# Patient Record
Sex: FEMALE | URBAN - METROPOLITAN AREA
[De-identification: names, ages, dates, MRNs, and addresses within clinical notes are randomized per-mention and may not be internally consistent; named-entity substitution may affect disease eponyms.]

---

## 2024-09-25 ENCOUNTER — OFFICE VISIT (OUTPATIENT)
Dept: URBAN - METROPOLITAN AREA CLINIC 115 | Facility: CLINIC | Age: 74
End: 2024-09-25
Payer: MEDICARE

## 2024-09-25 ENCOUNTER — LAB OUTSIDE AN ENCOUNTER (OUTPATIENT)
Dept: URBAN - METROPOLITAN AREA CLINIC 115 | Facility: CLINIC | Age: 74
End: 2024-09-25

## 2024-09-25 ENCOUNTER — DASHBOARD ENCOUNTERS (OUTPATIENT)
Age: 74
End: 2024-09-25

## 2024-09-25 VITALS
BODY MASS INDEX: 23.64 KG/M2 | TEMPERATURE: 96.7 F | HEART RATE: 60 BPM | SYSTOLIC BLOOD PRESSURE: 144 MMHG | HEIGHT: 63 IN | WEIGHT: 133.4 LBS | DIASTOLIC BLOOD PRESSURE: 76 MMHG

## 2024-09-25 DIAGNOSIS — R10.13 EPIGASTRIC PAIN: ICD-10-CM

## 2024-09-25 DIAGNOSIS — Z79.01 ANTICOAGULATED ON COUMADIN: ICD-10-CM

## 2024-09-25 DIAGNOSIS — M81.0 OSTEOPOROSIS WITHOUT CURRENT PATHOLOGICAL FRACTURE, UNSPECIFIED OSTEOPOROSIS TYPE: ICD-10-CM

## 2024-09-25 DIAGNOSIS — K59.09 OTHER CONSTIPATION: ICD-10-CM

## 2024-09-25 DIAGNOSIS — D64.9 CHRONIC ANEMIA: ICD-10-CM

## 2024-09-25 DIAGNOSIS — R63.0 DECREASED APPETITE: ICD-10-CM

## 2024-09-25 DIAGNOSIS — R12 HEARTBURN: ICD-10-CM

## 2024-09-25 PROBLEM — 64379006: Status: ACTIVE | Noted: 2024-09-25

## 2024-09-25 PROBLEM — 10191004: Status: ACTIVE | Noted: 2024-09-25

## 2024-09-25 PROBLEM — 64859006: Status: ACTIVE | Noted: 2024-09-25

## 2024-09-25 PROCEDURE — 99244 OFF/OP CNSLTJ NEW/EST MOD 40: CPT

## 2024-09-25 RX ORDER — WARFARIN 2.5 MG/1
TABLET ORAL
Qty: 180 TABLET | Status: ACTIVE | COMMUNITY

## 2024-09-25 RX ORDER — LOSARTAN POTASSIUM AND HYDROCHLOROTHIAZIDE 100; 12.5 MG/1; MG/1
TAKE ONE TABLET BY MOUTH ONE TIME DAILY TABLET, FILM COATED ORAL
Qty: 30 UNSPECIFIED | Refills: 4 | Status: ACTIVE | COMMUNITY

## 2024-09-25 RX ORDER — OMEPRAZOLE 40 MG/1
1 CAPSULE 30 MINUTES BEFORE MORNING MEAL CAPSULE, DELAYED RELEASE ORAL ONCE A DAY
OUTPATIENT

## 2024-09-25 RX ORDER — OMEPRAZOLE 40 MG/1
1 CAPSULE 30 MINUTES BEFORE MORNING MEAL CAPSULE, DELAYED RELEASE ORAL ONCE A DAY
Status: ACTIVE | COMMUNITY

## 2024-09-25 RX ORDER — NIFEDIPINE 90 MG/1
TAKE ONE TABLET BY MOUTH ONE TIME DAILY TABLET, EXTENDED RELEASE ORAL
Qty: 90 UNSPECIFIED | Refills: 1 | Status: ACTIVE | COMMUNITY

## 2024-09-25 RX ORDER — LATANOPROST 50 UG/ML
INSTILL ONE DROP IN EACH EYE EVERY EVENING SOLUTION OPHTHALMIC
Qty: 2.5 UNSPECIFIED | Refills: 5 | Status: ACTIVE | COMMUNITY

## 2024-09-25 RX ORDER — METHOCARBAMOL 750 MG/1
TAKE ONE TABLET BY MOUTH AT BEDTIME AS NEEDED FOR PAIN TABLET ORAL
Qty: 30 UNSPECIFIED | Refills: 4 | Status: ACTIVE | COMMUNITY

## 2024-09-25 RX ORDER — SOTALOL HYDROCHLORIDE 80 MG/1
1 TABLET TABLET ORAL
Status: ACTIVE | COMMUNITY

## 2024-09-25 RX ORDER — ATORVASTATIN CALCIUM 10 MG/1
TABLET, FILM COATED ORAL
Qty: 90 TABLET | Status: ACTIVE | COMMUNITY

## 2024-09-25 RX ORDER — LEVOTHYROXINE SODIUM 50 UG/1
TABLET ORAL
Qty: 90 TABLET | Status: ACTIVE | COMMUNITY

## 2024-09-25 NOTE — HPI-TODAY'S VISIT:
75 y/o F with PMH of hypothyroidism, mitral valve replacement and paroxysmal afib on coumadin, HTN, HLD, CHF, arthritis, osteoporosis on vit D/Ca presents on referral from Dr. Jomar Dawkins with c/o RUQ/epigastric pain and reflux intermittently for over 1 yr. A copy of this note will be sent to referring provider.  States her sx got more frequent in the last 2 mos. Before it would occur for 1 week and then would be fine for 3 weeks; but since August, she would have sx daily. Reports bloating, nausea, esophageal pain up to the neck, feeling like stomach wasn't emptying, decreased appetite, gas pain, loose stools. BMs are usually qd-qod, incomplete evacuation. Has tried omeprazole 20mg, 40mg qd and with tums/zantac. Does note that with omeprazole 40mg qd her sx have gotten better. RUQ US on 8/29/24 was normal. 6/2024 labs showed Hgb 9.9, MCV 96.4, Na 130, high vit B12, TSH 2.51, normal iron panel. Denies vomiting, blood in stool, dysphagia, odynophagia, unintentional weight loss, early satiety. Denies NSAID use. Occ EtOH. Denies tobacco/marijuana use.  Had CT colonography in 2018, normal per pt.

## 2024-09-26 LAB
HEMATOCRIT: 29.7
HEMOGLOBIN: 9.8
MCH: 32.1
MCHC: 33
MCV: 97.4
MPV: 10
PLATELET COUNT: 284
RDW: 12.9
RED BLOOD CELL COUNT: 3.05
WHITE BLOOD CELL COUNT: 7.2

## 2024-11-20 ENCOUNTER — TELEPHONE ENCOUNTER (OUTPATIENT)
Dept: URBAN - METROPOLITAN AREA CLINIC 85 | Facility: CLINIC | Age: 74
End: 2024-11-20

## 2024-11-27 ENCOUNTER — OFFICE VISIT (OUTPATIENT)
Dept: URBAN - METROPOLITAN AREA CLINIC 115 | Facility: CLINIC | Age: 74
End: 2024-11-27

## 2024-12-02 ENCOUNTER — CLAIMS CREATED FROM THE CLAIM WINDOW (OUTPATIENT)
Dept: URBAN - METROPOLITAN AREA SURGERY CENTER 13 | Facility: SURGERY CENTER | Age: 74
End: 2024-12-02
Payer: MEDICARE

## 2024-12-02 ENCOUNTER — CLAIMS CREATED FROM THE CLAIM WINDOW (OUTPATIENT)
Dept: URBAN - METROPOLITAN AREA CLINIC 4 | Facility: CLINIC | Age: 74
End: 2024-12-02
Payer: MEDICARE

## 2024-12-02 DIAGNOSIS — K21.9 GASTRO-ESOPHAGEAL REFLUX DISEASE WITHOUT ESOPHAGITIS: ICD-10-CM

## 2024-12-02 DIAGNOSIS — K31.89 OTHER DISEASES OF STOMACH AND DUODENUM: ICD-10-CM

## 2024-12-02 DIAGNOSIS — R10.13 EPIGASTRIC ABDOMINAL PAIN: ICD-10-CM

## 2024-12-02 DIAGNOSIS — K21.00 GERD WITH ESOPHAGITIS WITHOUT BLEEDING: ICD-10-CM

## 2024-12-02 DIAGNOSIS — K29.70 GASTRITIS, UNSPECIFIED, WITHOUT BLEEDING: ICD-10-CM

## 2024-12-02 DIAGNOSIS — K20.80 OTHER ESOPHAGITIS WITHOUT BLEEDING: ICD-10-CM

## 2024-12-02 DIAGNOSIS — K20.80 ESOPHAGITIS, LOS ANGELES GRADE B: ICD-10-CM

## 2024-12-02 PROCEDURE — 88305 TISSUE EXAM BY PATHOLOGIST: CPT | Performed by: STUDENT IN AN ORGANIZED HEALTH CARE EDUCATION/TRAINING PROGRAM

## 2024-12-02 PROCEDURE — 00731 ANES UPR GI NDSC PX NOS: CPT | Performed by: CASE MANAGER/CARE COORDINATOR

## 2024-12-02 PROCEDURE — 88342 IMHCHEM/IMCYTCHM 1ST ANTB: CPT | Performed by: STUDENT IN AN ORGANIZED HEALTH CARE EDUCATION/TRAINING PROGRAM

## 2024-12-02 PROCEDURE — 00731 ANES UPR GI NDSC PX NOS: CPT | Performed by: NURSE ANESTHETIST, CERTIFIED REGISTERED

## 2024-12-02 PROCEDURE — 43239 EGD BIOPSY SINGLE/MULTIPLE: CPT | Performed by: CLINIC/CENTER

## 2024-12-02 PROCEDURE — 43239 EGD BIOPSY SINGLE/MULTIPLE: CPT | Performed by: INTERNAL MEDICINE

## 2024-12-10 ENCOUNTER — OFFICE VISIT (OUTPATIENT)
Dept: URBAN - METROPOLITAN AREA CLINIC 115 | Facility: CLINIC | Age: 74
End: 2024-12-10
Payer: MEDICARE

## 2024-12-10 VITALS
WEIGHT: 137.2 LBS | DIASTOLIC BLOOD PRESSURE: 71 MMHG | BODY MASS INDEX: 24.31 KG/M2 | HEIGHT: 63 IN | SYSTOLIC BLOOD PRESSURE: 123 MMHG | HEART RATE: 65 BPM | TEMPERATURE: 98.6 F

## 2024-12-10 DIAGNOSIS — K21.00 GASTROESOPHAGEAL REFLUX DISEASE WITH ESOPHAGITIS WITHOUT HEMORRHAGE: ICD-10-CM

## 2024-12-10 DIAGNOSIS — K59.09 CHRONIC CONSTIPATION: ICD-10-CM

## 2024-12-10 PROBLEM — 266433003: Status: ACTIVE | Noted: 2024-12-10

## 2024-12-10 PROCEDURE — 99213 OFFICE O/P EST LOW 20 MIN: CPT

## 2024-12-10 RX ORDER — SOTALOL HYDROCHLORIDE 80 MG/1
1 TABLET TABLET ORAL
Status: ACTIVE | COMMUNITY

## 2024-12-10 RX ORDER — OMEPRAZOLE 40 MG/1
1 CAPSULE 30 MINUTES BEFORE MORNING MEAL CAPSULE, DELAYED RELEASE ORAL ONCE A DAY
Status: ACTIVE | COMMUNITY

## 2024-12-10 RX ORDER — NIFEDIPINE 90 MG/1
TAKE ONE TABLET BY MOUTH ONE TIME DAILY TABLET, EXTENDED RELEASE ORAL
Qty: 90 UNSPECIFIED | Refills: 1 | Status: ACTIVE | COMMUNITY

## 2024-12-10 RX ORDER — OMEPRAZOLE 40 MG/1
1 CAPSULE 30 MINUTES BEFORE MORNING MEAL CAPSULE, DELAYED RELEASE ORAL ONCE A DAY
Qty: 30 | Refills: 1

## 2024-12-10 RX ORDER — METHOCARBAMOL 750 MG/1
TAKE ONE TABLET BY MOUTH AT BEDTIME AS NEEDED FOR PAIN TABLET ORAL
Qty: 30 UNSPECIFIED | Refills: 4 | Status: ACTIVE | COMMUNITY

## 2024-12-10 RX ORDER — LEVOTHYROXINE SODIUM 50 UG/1
TABLET ORAL
Qty: 90 TABLET | Status: ACTIVE | COMMUNITY

## 2024-12-10 RX ORDER — ATORVASTATIN CALCIUM 10 MG/1
TABLET, FILM COATED ORAL
Qty: 90 TABLET | Status: ACTIVE | COMMUNITY

## 2024-12-10 RX ORDER — LATANOPROST 50 UG/ML
INSTILL ONE DROP IN EACH EYE EVERY EVENING SOLUTION OPHTHALMIC
Qty: 2.5 UNSPECIFIED | Refills: 5 | Status: ACTIVE | COMMUNITY

## 2024-12-10 RX ORDER — WARFARIN 2.5 MG/1
TABLET ORAL
Qty: 180 TABLET | Status: ACTIVE | COMMUNITY

## 2024-12-10 RX ORDER — LOSARTAN POTASSIUM AND HYDROCHLOROTHIAZIDE 100; 12.5 MG/1; MG/1
TAKE ONE TABLET BY MOUTH ONE TIME DAILY TABLET, FILM COATED ORAL
Qty: 30 UNSPECIFIED | Refills: 4 | Status: ACTIVE | COMMUNITY

## 2024-12-10 NOTE — HPI-TODAY'S VISIT:
73 y/o F with PMH of hypothyroidism, mitral valve replacement and paroxysmal afib on coumadin, HTN, HLD, CHF, arthritis, osteoporosis on vit D/Ca presents on referral from Dr. Jomar Dawkins with c/o RUQ/epigastric pain and reflux intermittently for over 1 yr. A copy of this note will be sent to referring provider.  Has tried omeprazole 20mg with tums/zantac. RUQ US on 8/29/24 was normal. 6/2024 labs showed Hgb 9.9, MCV 96.4, Na 130, high vit B12, TSH 2.51, normal iron panel. Denies NSAID use. Occ EtOH. Denies tobacco/marijuana use.  Had CT colonography in 2018, normal per pt.  9/2024 Hgb 9.8, MCV 97.4 12/2024 EGD with Dr. 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 Had gone to the ER for low Mg and is now on supplementation. Taking omeprazole 40mg without needing pepcid qhs, took Tums prn (only twice in the last month). Antireflux diet changes include avoiding fatty foods (now drinking 1% milk and avoiding creamy food), no onions, lunch meat. No nausea, abd pain. Heartburn is momentarily with bending forward. Has been on high fiber and colace BID; BMs are better in terms of incomplete evacuation, still qd to qod. Bloating has also been better. Miralax with colace BID gave her diarrhea.

## 2025-02-11 ENCOUNTER — OFFICE VISIT (OUTPATIENT)
Dept: URBAN - METROPOLITAN AREA CLINIC 115 | Facility: CLINIC | Age: 75
End: 2025-02-11
Payer: MEDICARE

## 2025-02-11 VITALS
HEART RATE: 65 BPM | WEIGHT: 136.2 LBS | TEMPERATURE: 97.2 F | SYSTOLIC BLOOD PRESSURE: 134 MMHG | DIASTOLIC BLOOD PRESSURE: 66 MMHG | BODY MASS INDEX: 24.13 KG/M2 | HEIGHT: 63 IN

## 2025-02-11 DIAGNOSIS — D64.9 CHRONIC ANEMIA: ICD-10-CM

## 2025-02-11 DIAGNOSIS — K59.09 CHRONIC CONSTIPATION: ICD-10-CM

## 2025-02-11 DIAGNOSIS — K21.00 GASTROESOPHAGEAL REFLUX DISEASE WITH ESOPHAGITIS WITHOUT HEMORRHAGE: ICD-10-CM

## 2025-02-11 PROBLEM — 191268006: Status: ACTIVE | Noted: 2025-02-11

## 2025-02-11 PROBLEM — 236069009: Status: ACTIVE | Noted: 2025-02-11

## 2025-02-11 PROCEDURE — 99213 OFFICE O/P EST LOW 20 MIN: CPT

## 2025-02-11 RX ORDER — NIFEDIPINE 90 MG/1
TAKE ONE TABLET BY MOUTH ONE TIME DAILY TABLET, EXTENDED RELEASE ORAL
Qty: 90 UNSPECIFIED | Refills: 1 | Status: ACTIVE | COMMUNITY

## 2025-02-11 RX ORDER — WARFARIN 2.5 MG/1
TABLET ORAL
Qty: 180 TABLET | Status: ACTIVE | COMMUNITY

## 2025-02-11 RX ORDER — METHOCARBAMOL 750 MG/1
TAKE ONE TABLET BY MOUTH AT BEDTIME AS NEEDED FOR PAIN TABLET ORAL
Qty: 30 UNSPECIFIED | Refills: 4 | Status: ACTIVE | COMMUNITY

## 2025-02-11 RX ORDER — LEVOTHYROXINE SODIUM 50 UG/1
TABLET ORAL
Qty: 90 TABLET | Status: ACTIVE | COMMUNITY

## 2025-02-11 RX ORDER — OMEPRAZOLE 20 MG/1
1 CAPSULE 30 MINUTES BEFORE MORNING MEAL CAPSULE, DELAYED RELEASE ORAL ONCE A DAY
Qty: 30 | Refills: 1 | OUTPATIENT
Start: 2025-02-11

## 2025-02-11 RX ORDER — SOTALOL HYDROCHLORIDE 80 MG/1
1 TABLET TABLET ORAL
Status: ACTIVE | COMMUNITY

## 2025-02-11 RX ORDER — LOSARTAN POTASSIUM AND HYDROCHLOROTHIAZIDE 100; 12.5 MG/1; MG/1
TAKE ONE TABLET BY MOUTH ONE TIME DAILY TABLET, FILM COATED ORAL
Qty: 30 UNSPECIFIED | Refills: 4 | Status: ACTIVE | COMMUNITY

## 2025-02-11 RX ORDER — OMEPRAZOLE 40 MG/1
1 CAPSULE 30 MINUTES BEFORE MORNING MEAL CAPSULE, DELAYED RELEASE ORAL ONCE A DAY
Qty: 30 | Refills: 1 | Status: ACTIVE | COMMUNITY

## 2025-02-11 RX ORDER — LATANOPROST 50 UG/ML
INSTILL ONE DROP IN EACH EYE EVERY EVENING SOLUTION OPHTHALMIC
Qty: 2.5 UNSPECIFIED | Refills: 5 | Status: ACTIVE | COMMUNITY

## 2025-02-11 RX ORDER — ATORVASTATIN CALCIUM 10 MG/1
TABLET, FILM COATED ORAL
Qty: 90 TABLET | Status: ACTIVE | COMMUNITY

## 2025-02-11 NOTE — HPI-TODAY'S VISIT:
73 y/o F with PMH of hypothyroidism, mitral valve replacement and paroxysmal afib on coumadin, HTN, HLD, CHF, arthritis, osteoporosis on vit D/Ca presents on referral from Dr. Jomar Dawkins with c/o RUQ/epigastric pain and reflux intermittently for over 1 yr. A copy of this note will be sent to referring provider.   Has tried omeprazole 20mg with tums/zantac. RUQ US on 8/29/24 was normal. 12/2024 EGD with Dr. 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 Had gone to the ER for low Mg and is now on supplementation.  Antireflux diet changes include avoiding fatty foods (now drinking 1% milk and avoiding creamy food), no onions, lunch meat. No nausea, abd pain. Tried to take omeprazole 40mg qod and needed to take tums again.   Has increased her fiber with colace. BMs are better in terms of evacuation, without diarrhea or bloating, qd-qod. With miralax qod or q3-4 days, she would have a normal BM, then gas and diarrhea that would last 1.5 days.  6/2024 labs showed Hgb 9.9, MCV 96.4, Na 130, high vit B12, TSH 2.51, normal iron panel. 9/2024 Hgb 9.8, MCV 97.4 12/2024 labs with PCP showed Hgb 10.4, MCV 97.8, normal iron panel/TSH/folate levels, high vit B12. Had neg hematology workup years ago, states her anemia has been chronic since she was in her 20s. No blood in stool.  Denies NSAID use. Occ EtOH. Denies tobacco/marijuana use.  Had CT colonography in 2018, normal per pt.

## 2025-04-11 ENCOUNTER — OFFICE VISIT (OUTPATIENT)
Dept: URBAN - METROPOLITAN AREA CLINIC 115 | Facility: CLINIC | Age: 75
End: 2025-04-11
Payer: MEDICARE

## 2025-04-11 DIAGNOSIS — K59.09 CHRONIC CONSTIPATION: ICD-10-CM

## 2025-04-11 DIAGNOSIS — K21.00 GASTROESOPHAGEAL REFLUX DISEASE WITH ESOPHAGITIS WITHOUT HEMORRHAGE: ICD-10-CM

## 2025-04-11 DIAGNOSIS — D64.9 CHRONIC ANEMIA: ICD-10-CM

## 2025-04-11 PROCEDURE — 99213 OFFICE O/P EST LOW 20 MIN: CPT | Performed by: INTERNAL MEDICINE

## 2025-04-11 RX ORDER — ATORVASTATIN CALCIUM 10 MG/1
TABLET, FILM COATED ORAL
Qty: 90 TABLET | Status: ACTIVE | COMMUNITY

## 2025-04-11 RX ORDER — OMEPRAZOLE 20 MG/1
1 CAPSULE 30 MINUTES BEFORE MORNING MEAL CAPSULE, DELAYED RELEASE ORAL ONCE A DAY
Qty: 30 | Refills: 1 | Status: ACTIVE | COMMUNITY
Start: 2025-02-11

## 2025-04-11 RX ORDER — WARFARIN 2.5 MG/1
TABLET ORAL
Qty: 180 TABLET | Status: ACTIVE | COMMUNITY

## 2025-04-11 RX ORDER — LEVOTHYROXINE SODIUM 50 UG/1
TABLET ORAL
Qty: 90 TABLET | Status: ACTIVE | COMMUNITY

## 2025-04-11 RX ORDER — OMEPRAZOLE 40 MG/1
1 CAPSULE 30 MINUTES BEFORE MORNING MEAL CAPSULE, DELAYED RELEASE ORAL ONCE A DAY
Qty: 30 | Refills: 1 | Status: ACTIVE | COMMUNITY

## 2025-04-11 RX ORDER — NIFEDIPINE 90 MG/1
TAKE ONE TABLET BY MOUTH ONE TIME DAILY TABLET, EXTENDED RELEASE ORAL
Qty: 90 UNSPECIFIED | Refills: 1 | Status: ACTIVE | COMMUNITY

## 2025-04-11 RX ORDER — LATANOPROST 50 UG/ML
INSTILL ONE DROP IN EACH EYE EVERY EVENING SOLUTION OPHTHALMIC
Qty: 2.5 UNSPECIFIED | Refills: 5 | Status: ACTIVE | COMMUNITY

## 2025-04-11 RX ORDER — OMEPRAZOLE 20 MG/1
1 CAPSULE 30 MINUTES BEFORE MORNING MEAL CAPSULE, DELAYED RELEASE ORAL ONCE A DAY
Qty: 30 | Refills: 1 | OUTPATIENT
Start: 2025-04-11

## 2025-04-11 RX ORDER — METHOCARBAMOL 750 MG/1
TAKE ONE TABLET BY MOUTH AT BEDTIME AS NEEDED FOR PAIN TABLET ORAL
Qty: 30 UNSPECIFIED | Refills: 4 | Status: ACTIVE | COMMUNITY

## 2025-04-11 RX ORDER — LOSARTAN POTASSIUM AND HYDROCHLOROTHIAZIDE 100; 12.5 MG/1; MG/1
TAKE ONE TABLET BY MOUTH ONE TIME DAILY TABLET, FILM COATED ORAL
Qty: 30 UNSPECIFIED | Refills: 4 | Status: ACTIVE | COMMUNITY

## 2025-04-11 RX ORDER — SOTALOL HYDROCHLORIDE 80 MG/1
1 TABLET TABLET ORAL
Status: ACTIVE | COMMUNITY